# Patient Record
Sex: MALE | URBAN - METROPOLITAN AREA
[De-identification: names, ages, dates, MRNs, and addresses within clinical notes are randomized per-mention and may not be internally consistent; named-entity substitution may affect disease eponyms.]

---

## 2019-12-20 ENCOUNTER — TELEPHONE (OUTPATIENT)
Dept: UROLOGY | Age: 64
End: 2019-12-20

## 2020-06-09 ENCOUNTER — OFFICE VISIT (OUTPATIENT)
Dept: FAMILY MEDICINE CLINIC | Facility: CLINIC | Age: 65
End: 2020-06-09

## 2020-06-09 ENCOUNTER — LABORATORY ENCOUNTER (OUTPATIENT)
Dept: LAB | Age: 65
End: 2020-06-09
Attending: FAMILY MEDICINE

## 2020-06-09 VITALS
TEMPERATURE: 98 F | HEIGHT: 70 IN | WEIGHT: 213 LBS | HEART RATE: 59 BPM | SYSTOLIC BLOOD PRESSURE: 128 MMHG | OXYGEN SATURATION: 97 % | DIASTOLIC BLOOD PRESSURE: 80 MMHG | BODY MASS INDEX: 30.49 KG/M2

## 2020-06-09 DIAGNOSIS — E11.9 TYPE 2 DIABETES MELLITUS WITHOUT COMPLICATION, WITH LONG-TERM CURRENT USE OF INSULIN (HCC): ICD-10-CM

## 2020-06-09 DIAGNOSIS — I10 ESSENTIAL HYPERTENSION: ICD-10-CM

## 2020-06-09 DIAGNOSIS — Z79.01 ENCOUNTER FOR CURRENT LONG-TERM USE OF ANTICOAGULANTS: ICD-10-CM

## 2020-06-09 DIAGNOSIS — N18.30 CHRONIC KIDNEY DISEASE, STAGE III (MODERATE) (HCC): ICD-10-CM

## 2020-06-09 DIAGNOSIS — Z79.899 ENCOUNTER FOR LONG-TERM (CURRENT) USE OF MEDICATIONS: ICD-10-CM

## 2020-06-09 DIAGNOSIS — Z79.899 ENCOUNTER FOR LONG-TERM (CURRENT) USE OF MEDICATIONS: Primary | ICD-10-CM

## 2020-06-09 DIAGNOSIS — Z79.4 TYPE 2 DIABETES MELLITUS WITHOUT COMPLICATION, WITH LONG-TERM CURRENT USE OF INSULIN (HCC): ICD-10-CM

## 2020-06-09 DIAGNOSIS — K21.00 GASTROESOPHAGEAL REFLUX DISEASE WITH ESOPHAGITIS: ICD-10-CM

## 2020-06-09 DIAGNOSIS — Z12.5 SCREENING PSA (PROSTATE SPECIFIC ANTIGEN): ICD-10-CM

## 2020-06-09 DIAGNOSIS — E78.2 MIXED HYPERLIPIDEMIA: ICD-10-CM

## 2020-06-09 DIAGNOSIS — I69.30 LATE EFFECT OF CEREBROVASCULAR ACCIDENT (CVA): ICD-10-CM

## 2020-06-09 PROBLEM — Z86.73 HX OF COMPLETED STROKE: Status: ACTIVE | Noted: 2018-09-14

## 2020-06-09 PROBLEM — M24.541 CONTRACTURE, RIGHT HAND: Status: ACTIVE | Noted: 2018-09-12

## 2020-06-09 PROBLEM — H53.461 HEMIANOPIA OF RIGHT EYE: Status: ACTIVE | Noted: 2018-09-14

## 2020-06-09 PROBLEM — Z87.81 HX OF COMPRESSION FRACTURE OF SPINE: Status: ACTIVE | Noted: 2018-09-14

## 2020-06-09 PROBLEM — I25.2 HX OF MYOCARDIAL INFARCTION: Status: ACTIVE | Noted: 2018-09-14

## 2020-06-09 PROBLEM — N40.1 ENLARGED PROSTATE WITH LOWER URINARY TRACT SYMPTOMS (LUTS): Status: ACTIVE | Noted: 2018-09-14

## 2020-06-09 PROBLEM — Z87.19 H/O: GI BLEED: Status: ACTIVE | Noted: 2018-09-12

## 2020-06-09 PROCEDURE — 84153 ASSAY OF PSA TOTAL: CPT | Performed by: FAMILY MEDICINE

## 2020-06-09 PROCEDURE — 99202 OFFICE O/P NEW SF 15 MIN: CPT | Performed by: FAMILY MEDICINE

## 2020-06-09 PROCEDURE — 80053 COMPREHEN METABOLIC PANEL: CPT | Performed by: FAMILY MEDICINE

## 2020-06-09 PROCEDURE — 85025 COMPLETE CBC W/AUTO DIFF WBC: CPT | Performed by: FAMILY MEDICINE

## 2020-06-09 PROCEDURE — 80061 LIPID PANEL: CPT | Performed by: FAMILY MEDICINE

## 2020-06-09 PROCEDURE — 85610 PROTHROMBIN TIME: CPT | Performed by: FAMILY MEDICINE

## 2020-06-09 PROCEDURE — 83036 HEMOGLOBIN GLYCOSYLATED A1C: CPT | Performed by: FAMILY MEDICINE

## 2020-06-09 RX ORDER — LOVASTATIN 20 MG/1
20 TABLET ORAL DAILY
COMMUNITY
Start: 2020-05-09 | End: 2020-06-10

## 2020-06-09 RX ORDER — WARFARIN SODIUM 4 MG/1
4 TABLET ORAL DAILY
COMMUNITY
Start: 2020-04-09 | End: 2020-06-10

## 2020-06-09 RX ORDER — TAMSULOSIN HYDROCHLORIDE 0.4 MG/1
0.4 CAPSULE ORAL DAILY
COMMUNITY
Start: 2020-05-09 | End: 2020-06-10

## 2020-06-09 RX ORDER — METOPROLOL TARTRATE 50 MG/1
TABLET, FILM COATED ORAL 2 TIMES DAILY
COMMUNITY
Start: 2020-04-09 | End: 2020-06-10

## 2020-06-09 RX ORDER — PANTOPRAZOLE SODIUM 40 MG/1
TABLET, DELAYED RELEASE ORAL
COMMUNITY
Start: 2020-05-09 | End: 2020-06-10

## 2020-06-09 RX ORDER — LISINOPRIL AND HYDROCHLOROTHIAZIDE 20; 12.5 MG/1; MG/1
1 TABLET ORAL DAILY
COMMUNITY
Start: 2020-04-09 | End: 2020-06-10

## 2020-06-10 ENCOUNTER — TELEPHONE (OUTPATIENT)
Dept: FAMILY MEDICINE CLINIC | Facility: CLINIC | Age: 65
End: 2020-06-10

## 2020-06-10 RX ORDER — LISINOPRIL AND HYDROCHLOROTHIAZIDE 20; 12.5 MG/1; MG/1
1 TABLET ORAL DAILY
Qty: 90 TABLET | Refills: 0 | Status: SHIPPED | OUTPATIENT
Start: 2020-06-10 | End: 2020-08-06

## 2020-06-10 RX ORDER — WARFARIN SODIUM 4 MG/1
4 TABLET ORAL DAILY
Qty: 30 TABLET | Refills: 0 | Status: SHIPPED | OUTPATIENT
Start: 2020-06-10 | End: 2020-08-06

## 2020-06-10 RX ORDER — WARFARIN SODIUM 4 MG/1
TABLET ORAL
Qty: 90 TABLET | Refills: 0 | OUTPATIENT
Start: 2020-06-10

## 2020-06-10 RX ORDER — METOPROLOL TARTRATE 50 MG/1
50 TABLET, FILM COATED ORAL 2 TIMES DAILY
Qty: 180 TABLET | Refills: 0 | Status: SHIPPED | OUTPATIENT
Start: 2020-06-10 | End: 2020-08-06

## 2020-06-10 RX ORDER — TAMSULOSIN HYDROCHLORIDE 0.4 MG/1
0.4 CAPSULE ORAL DAILY
Qty: 90 CAPSULE | Refills: 0 | Status: SHIPPED | OUTPATIENT
Start: 2020-06-10 | End: 2020-08-06

## 2020-06-10 RX ORDER — PANTOPRAZOLE SODIUM 40 MG/1
40 TABLET, DELAYED RELEASE ORAL
Qty: 90 TABLET | Refills: 0 | Status: SHIPPED | OUTPATIENT
Start: 2020-06-10 | End: 2020-08-06

## 2020-06-10 RX ORDER — LOVASTATIN 20 MG/1
20 TABLET ORAL DAILY
Qty: 90 TABLET | Refills: 0 | Status: SHIPPED | OUTPATIENT
Start: 2020-06-10 | End: 2020-08-06

## 2020-06-10 NOTE — TELEPHONE ENCOUNTER
Lila Mckenna called and left a message that [facility] doesn't have his script and he has been out of the medication for 4 days.

## 2020-06-10 NOTE — PROGRESS NOTES
Annamaria Espinosa is a 76year old male.   Patient presents with:  Establish Care      Chief Complaint Reviewed and Verified  No Further Nursing Notes to Review    Tobacco Reviewed  Allergies Reviewed  Medications Reviewed  Problem   List Reviewed  Medical Hi no cyanosis, clubbing or edema  Has contracted hand right and also right limp  Uses cane       ASSESSMENT AND PLAN:     Encounter for long-term (current) use of medications  (primary encounter diagnosis)  Gastroesophageal reflux disease with esophagitis  E blood pressure check. Meds & Refills for this Visit:  Requested Prescriptions     Signed Prescriptions Disp Refills   • Lisinopril-hydroCHLOROthiazide 20-12.5 MG Oral Tab 90 tablet 0     Sig: Take 1 tablet by mouth daily.    • Lovastatin 20 MG Oral Tab

## 2020-06-10 NOTE — TELEPHONE ENCOUNTER
See below----he left a message on our voice mail----no name of medication was left. Do you know what he is referring to?

## 2020-06-23 ENCOUNTER — TELEPHONE (OUTPATIENT)
Dept: FAMILY MEDICINE CLINIC | Facility: CLINIC | Age: 65
End: 2020-06-23

## 2020-06-23 NOTE — TELEPHONE ENCOUNTER
Marcial Daly has tried to reach [place] about his metformin, there should have been a refill on May 9th but there has not been a refill at all in May or June. They just wanted to let us know and if we need to speak with him about his medication.   No need to lilliana

## 2020-06-24 ENCOUNTER — TELEPHONE (OUTPATIENT)
Dept: FAMILY MEDICINE CLINIC | Facility: CLINIC | Age: 65
End: 2020-06-24

## 2020-06-24 DIAGNOSIS — N18.30 CHRONIC KIDNEY DISEASE, STAGE III (MODERATE) (HCC): ICD-10-CM

## 2020-06-24 DIAGNOSIS — Z79.899 ENCOUNTER FOR LONG-TERM (CURRENT) USE OF MEDICATIONS: Primary | ICD-10-CM

## 2020-06-24 DIAGNOSIS — Z79.4 TYPE 2 DIABETES MELLITUS WITHOUT COMPLICATION, WITH LONG-TERM CURRENT USE OF INSULIN (HCC): ICD-10-CM

## 2020-06-24 DIAGNOSIS — Z12.5 SCREENING PSA (PROSTATE SPECIFIC ANTIGEN): ICD-10-CM

## 2020-06-24 DIAGNOSIS — E78.2 MIXED HYPERLIPIDEMIA: ICD-10-CM

## 2020-06-24 DIAGNOSIS — E11.9 TYPE 2 DIABETES MELLITUS WITHOUT COMPLICATION, WITH LONG-TERM CURRENT USE OF INSULIN (HCC): ICD-10-CM

## 2020-06-24 NOTE — TELEPHONE ENCOUNTER
Spoke with patient who states he has been having chest pain x 24 hours. He denied any pain radiation from chest. He denied any SOB. Patient instructed to go to the ER. Patient stated he did not have a ride. Informed patient he should call 911.  He stated \"

## 2020-06-24 NOTE — TELEPHONE ENCOUNTER
Patient called back and left a voicemail stating he is feeling better and not going to go to the ER.  Tried calling the patient back twice, however got a busy signal. Call placed to patient's son Fidelia He, ok per OPAL, and he states the patient is feeling much b

## 2020-08-06 ENCOUNTER — OFFICE VISIT (OUTPATIENT)
Dept: FAMILY MEDICINE CLINIC | Facility: CLINIC | Age: 65
End: 2020-08-06

## 2020-08-06 VITALS
RESPIRATION RATE: 12 BRPM | HEART RATE: 64 BPM | TEMPERATURE: 98 F | HEIGHT: 70 IN | SYSTOLIC BLOOD PRESSURE: 138 MMHG | DIASTOLIC BLOOD PRESSURE: 80 MMHG | BODY MASS INDEX: 28.35 KG/M2 | OXYGEN SATURATION: 97 % | WEIGHT: 198 LBS

## 2020-08-06 DIAGNOSIS — Z86.73 HX OF COMPLETED STROKE: ICD-10-CM

## 2020-08-06 DIAGNOSIS — K21.00 GASTROESOPHAGEAL REFLUX DISEASE WITH ESOPHAGITIS: ICD-10-CM

## 2020-08-06 DIAGNOSIS — N40.1 BENIGN PROSTATIC HYPERPLASIA WITH URINARY OBSTRUCTION: ICD-10-CM

## 2020-08-06 DIAGNOSIS — I69.30 LATE EFFECT OF CEREBROVASCULAR ACCIDENT (CVA): ICD-10-CM

## 2020-08-06 DIAGNOSIS — I10 ESSENTIAL HYPERTENSION: ICD-10-CM

## 2020-08-06 DIAGNOSIS — E11.69 MIXED HYPERLIPIDEMIA DUE TO TYPE 2 DIABETES MELLITUS (HCC): ICD-10-CM

## 2020-08-06 DIAGNOSIS — Z79.4 TYPE 2 DIABETES MELLITUS WITHOUT COMPLICATION, WITH LONG-TERM CURRENT USE OF INSULIN (HCC): ICD-10-CM

## 2020-08-06 DIAGNOSIS — Z79.899 ENCOUNTER FOR LONG-TERM (CURRENT) USE OF MEDICATIONS: Primary | ICD-10-CM

## 2020-08-06 DIAGNOSIS — E78.2 MIXED HYPERLIPIDEMIA DUE TO TYPE 2 DIABETES MELLITUS (HCC): ICD-10-CM

## 2020-08-06 DIAGNOSIS — K21.9 GASTROESOPHAGEAL REFLUX DISEASE WITHOUT ESOPHAGITIS: ICD-10-CM

## 2020-08-06 DIAGNOSIS — Z79.01 LONG TERM (CURRENT) USE OF ANTICOAGULANTS: ICD-10-CM

## 2020-08-06 DIAGNOSIS — E11.9 TYPE 2 DIABETES MELLITUS WITHOUT COMPLICATION, WITH LONG-TERM CURRENT USE OF INSULIN (HCC): ICD-10-CM

## 2020-08-06 DIAGNOSIS — N18.30 CHRONIC KIDNEY DISEASE, STAGE III (MODERATE) (HCC): ICD-10-CM

## 2020-08-06 DIAGNOSIS — N13.8 BENIGN PROSTATIC HYPERPLASIA WITH URINARY OBSTRUCTION: ICD-10-CM

## 2020-08-06 DIAGNOSIS — I25.2 HX OF MYOCARDIAL INFARCTION: ICD-10-CM

## 2020-08-06 LAB — INR: 1 (ref 0.8–1.2)

## 2020-08-06 PROCEDURE — 85610 PROTHROMBIN TIME: CPT | Performed by: FAMILY MEDICINE

## 2020-08-06 PROCEDURE — 3079F DIAST BP 80-89 MM HG: CPT | Performed by: FAMILY MEDICINE

## 2020-08-06 PROCEDURE — 99214 OFFICE O/P EST MOD 30 MIN: CPT | Performed by: FAMILY MEDICINE

## 2020-08-06 PROCEDURE — 3008F BODY MASS INDEX DOCD: CPT | Performed by: FAMILY MEDICINE

## 2020-08-06 PROCEDURE — 3075F SYST BP GE 130 - 139MM HG: CPT | Performed by: FAMILY MEDICINE

## 2020-08-06 RX ORDER — WARFARIN SODIUM 4 MG/1
4 TABLET ORAL DAILY
Qty: 30 TABLET | Refills: 1 | Status: SHIPPED | OUTPATIENT
Start: 2020-08-06 | End: 2020-12-03

## 2020-08-06 RX ORDER — PANTOPRAZOLE SODIUM 40 MG/1
40 TABLET, DELAYED RELEASE ORAL
Qty: 90 TABLET | Refills: 1 | Status: SHIPPED | OUTPATIENT
Start: 2020-08-06 | End: 2021-01-18

## 2020-08-06 RX ORDER — TAMSULOSIN HYDROCHLORIDE 0.4 MG/1
0.4 CAPSULE ORAL DAILY
Qty: 90 CAPSULE | Refills: 0 | Status: CANCELLED | OUTPATIENT
Start: 2020-08-06 | End: 2020-11-04

## 2020-08-06 RX ORDER — WARFARIN SODIUM 4 MG/1
4 TABLET ORAL DAILY
Qty: 30 TABLET | Refills: 0 | Status: SHIPPED | OUTPATIENT
Start: 2020-08-06 | End: 2020-08-06

## 2020-08-06 RX ORDER — WARFARIN SODIUM 4 MG/1
TABLET ORAL
Qty: 90 TABLET | Refills: 0 | OUTPATIENT
Start: 2020-08-06

## 2020-08-06 RX ORDER — TAMSULOSIN HYDROCHLORIDE 0.4 MG/1
0.4 CAPSULE ORAL DAILY
Qty: 90 CAPSULE | Refills: 1 | Status: SHIPPED | OUTPATIENT
Start: 2020-08-06 | End: 2021-01-18

## 2020-08-06 RX ORDER — LOVASTATIN 20 MG/1
20 TABLET ORAL DAILY
Qty: 90 TABLET | Refills: 1 | Status: SHIPPED | OUTPATIENT
Start: 2020-08-06 | End: 2021-01-18

## 2020-08-06 RX ORDER — METOPROLOL TARTRATE 50 MG/1
50 TABLET, FILM COATED ORAL 2 TIMES DAILY
Qty: 180 TABLET | Refills: 1 | Status: SHIPPED | OUTPATIENT
Start: 2020-08-06 | End: 2021-01-18

## 2020-08-06 RX ORDER — LISINOPRIL AND HYDROCHLOROTHIAZIDE 20; 12.5 MG/1; MG/1
1 TABLET ORAL DAILY
Qty: 90 TABLET | Refills: 0 | Status: SHIPPED | OUTPATIENT
Start: 2020-08-06 | End: 2021-02-11

## 2020-08-06 RX ORDER — LISINOPRIL AND HYDROCHLOROTHIAZIDE 20; 12.5 MG/1; MG/1
1 TABLET ORAL DAILY
Qty: 90 TABLET | Refills: 0 | Status: CANCELLED | OUTPATIENT
Start: 2020-08-06 | End: 2020-11-04

## 2020-08-06 NOTE — TELEPHONE ENCOUNTER
Last OV: 8/6/20  Last refill:  Requested Prescriptions     Pending Prescriptions Disp Refills   • WARFARIN SODIUM 4 MG Oral Tab [Pharmacy Med Name: WARFARIN SOD 4MG TABLETS (BLUE)] 90 tablet 0     Sig: TAKE 1 TABLET BY MOUTH EVERY DAY     No future appoint

## 2020-08-06 NOTE — PROGRESS NOTES
Charlotte Chavira is a 76year old male. Patient presents with:  Medication Follow-Up: inrm.  6      Chief Complaint Reviewed and Verified  Nursing Notes Reviewed and   Verified  Tobacco Reviewed  Allergies Reviewed  Medications Reviewed    Problem List Revi outpatient medications on file prior to visit. No current facility-administered medications on file prior to visit.            Social History:  Social History    Tobacco Use      Smoking status: Never Smoker      Smokeless tobacco: Never Used    Alcohol us without complication, with long-term current use of insulin (hcc)  Gastroesophageal reflux disease without esophagitis  Gastroesophageal reflux disease with esophagitis  Benign prostatic hyperplasia with urinary obstruction  Mixed hyperlipidemia due to typ PROTHROMBIN TIME (Completed)    Mixed hyperlipidemia due to type 2 diabetes mellitus (HCC)        Relevant Medications    Lovastatin 20 MG Oral Tab    metFORMIN HCl 850 MG Oral Tab    Other Relevant Orders    PROTHROMBIN TIME (Completed)          Return in

## 2020-08-27 ENCOUNTER — OFFICE VISIT (OUTPATIENT)
Dept: FAMILY MEDICINE CLINIC | Facility: CLINIC | Age: 65
End: 2020-08-27

## 2020-08-27 VITALS
RESPIRATION RATE: 12 BRPM | BODY MASS INDEX: 27.2 KG/M2 | TEMPERATURE: 98 F | DIASTOLIC BLOOD PRESSURE: 88 MMHG | HEIGHT: 70 IN | WEIGHT: 190 LBS | OXYGEN SATURATION: 97 % | SYSTOLIC BLOOD PRESSURE: 138 MMHG | HEART RATE: 72 BPM

## 2020-08-27 DIAGNOSIS — Z86.73 HX OF COMPLETED STROKE: ICD-10-CM

## 2020-08-27 DIAGNOSIS — Z02.9 ADMINISTRATIVE ENCOUNTER: ICD-10-CM

## 2020-08-27 DIAGNOSIS — I10 ESSENTIAL HYPERTENSION: Primary | ICD-10-CM

## 2020-08-27 PROCEDURE — 99213 OFFICE O/P EST LOW 20 MIN: CPT | Performed by: FAMILY MEDICINE

## 2020-08-27 PROCEDURE — 3079F DIAST BP 80-89 MM HG: CPT | Performed by: FAMILY MEDICINE

## 2020-08-27 PROCEDURE — 3075F SYST BP GE 130 - 139MM HG: CPT | Performed by: FAMILY MEDICINE

## 2020-08-27 PROCEDURE — 3008F BODY MASS INDEX DOCD: CPT | Performed by: FAMILY MEDICINE

## 2020-08-27 NOTE — PROGRESS NOTES
Gareth Connor is a 76year old male. Patient presents with:  Forms Completion: inrm.  5    Chief Complaint Reviewed and Verified  Nursing Notes Reviewed and   Verified  Tobacco Reviewed  Allergies Reviewed  Medications Reviewed    Problem List Reviewed Take 1 tablet (40 mg total) by mouth every morning before breakfast. 90 tablet 1   • tamsulosin (FLOMAX) cap Take 1 capsule (0.4 mg total) by mouth daily.  90 capsule 1   • metFORMIN HCl 850 MG Oral Tab Take 1 tablet (850 mg total) by mouth 2 (two) times da were placed in this encounter. No orders of the defined types were placed in this encounter. Return if symptoms worsen or fail to improve, for When he has appropriate 's office. .      Meds & Refills for this Visit:  Requested Pres

## 2020-11-19 ENCOUNTER — OFFICE VISIT (OUTPATIENT)
Dept: FAMILY MEDICINE CLINIC | Facility: CLINIC | Age: 65
End: 2020-11-19
Payer: MEDICARE

## 2020-11-19 VITALS
DIASTOLIC BLOOD PRESSURE: 80 MMHG | BODY MASS INDEX: 27.77 KG/M2 | HEIGHT: 70 IN | OXYGEN SATURATION: 96 % | TEMPERATURE: 98 F | WEIGHT: 194 LBS | HEART RATE: 71 BPM | SYSTOLIC BLOOD PRESSURE: 138 MMHG

## 2020-11-19 DIAGNOSIS — N40.1 BENIGN PROSTATIC HYPERPLASIA WITH URINARY OBSTRUCTION: ICD-10-CM

## 2020-11-19 DIAGNOSIS — I10 ESSENTIAL HYPERTENSION: ICD-10-CM

## 2020-11-19 DIAGNOSIS — Z79.01 LONG TERM (CURRENT) USE OF ANTICOAGULANTS: ICD-10-CM

## 2020-11-19 DIAGNOSIS — N13.8 BENIGN PROSTATIC HYPERPLASIA WITH URINARY OBSTRUCTION: ICD-10-CM

## 2020-11-19 DIAGNOSIS — I71.4 ABDOMINAL AORTIC ANEURYSM (AAA) WITHOUT RUPTURE (HCC): ICD-10-CM

## 2020-11-19 DIAGNOSIS — N20.0 RENAL CALCULUS, BILATERAL: ICD-10-CM

## 2020-11-19 DIAGNOSIS — Z86.73 HX OF COMPLETED STROKE: ICD-10-CM

## 2020-11-19 DIAGNOSIS — N39.42 URINARY INCONTINENCE WITHOUT SENSORY AWARENESS: ICD-10-CM

## 2020-11-19 DIAGNOSIS — I69.30 LATE EFFECT OF CEREBROVASCULAR ACCIDENT (CVA): ICD-10-CM

## 2020-11-19 DIAGNOSIS — R19.5 LOOSE STOOLS: ICD-10-CM

## 2020-11-19 DIAGNOSIS — I25.2 HX OF MYOCARDIAL INFARCTION: ICD-10-CM

## 2020-11-19 DIAGNOSIS — Z79.899 ENCOUNTER FOR LONG-TERM (CURRENT) USE OF MEDICATIONS: Primary | ICD-10-CM

## 2020-11-19 DIAGNOSIS — N18.32 STAGE 3B CHRONIC KIDNEY DISEASE (HCC): ICD-10-CM

## 2020-11-19 PROCEDURE — 3075F SYST BP GE 130 - 139MM HG: CPT | Performed by: FAMILY MEDICINE

## 2020-11-19 PROCEDURE — 3008F BODY MASS INDEX DOCD: CPT | Performed by: FAMILY MEDICINE

## 2020-11-19 PROCEDURE — 3079F DIAST BP 80-89 MM HG: CPT | Performed by: FAMILY MEDICINE

## 2020-11-19 PROCEDURE — 99214 OFFICE O/P EST MOD 30 MIN: CPT | Performed by: FAMILY MEDICINE

## 2020-11-24 NOTE — PROGRESS NOTES
Giovani Claire is a 76year old male. Patient presents with:  ER F/U: dx with kidney stones .  inrm 6  Hypertension      Chief Complaint Reviewed and Verified  Nursing Notes Reviewed and   Verified  Tobacco Reviewed  Allergies Reviewed  Medications Review mouth daily. , Disp: 90 capsule, Rfl: 1    •  metFORMIN HCl 850 MG Oral Tab, Take 1 tablet (850 mg total) by mouth 2 (two) times daily with meals. , Disp: 180 tablet, Rfl: 1    No current facility-administered medications on file prior to visit.            So medications  (primary encounter diagnosis)  Essential hypertension  Abdominal aortic aneurysm (aaa) without rupture (hcc)  Benign prostatic hyperplasia with urinary obstruction  Stage 3b chronic kidney disease  Long term (current) use of anticoagulants  Re

## 2020-11-30 ENCOUNTER — TELEPHONE (OUTPATIENT)
Dept: FAMILY MEDICINE CLINIC | Facility: CLINIC | Age: 65
End: 2020-11-30

## 2020-11-30 DIAGNOSIS — Z86.73 HX OF COMPLETED STROKE: ICD-10-CM

## 2020-11-30 DIAGNOSIS — N39.42 URINARY INCONTINENCE WITHOUT SENSORY AWARENESS: Primary | ICD-10-CM

## 2020-11-30 DIAGNOSIS — R19.5 LOOSE STOOLS: ICD-10-CM

## 2020-11-30 RX ORDER — TAMSULOSIN HYDROCHLORIDE 0.4 MG/1
0.4 CAPSULE ORAL DAILY
COMMUNITY
Start: 2019-09-11 | End: 2020-11-30

## 2020-11-30 NOTE — TELEPHONE ENCOUNTER
Casey, son, is calling he would like to speak with someone about getting help for his father, such as helping him bath, get dressed ect. Robb said that it's becoming a lot for him to handle please call.
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
## 2020-12-01 ENCOUNTER — TELEPHONE (OUTPATIENT)
Dept: FAMILY MEDICINE CLINIC | Facility: CLINIC | Age: 65
End: 2020-12-01

## 2020-12-01 NOTE — TELEPHONE ENCOUNTER
Patient son is very upset and loudly verbalizing that patient is not able to take care of himself. He also stated that he is 32years old and should not be caring for a 75 y/o man that is \"incontinent\".  He stated his father needs to be seen by a doctor a

## 2020-12-02 NOTE — TELEPHONE ENCOUNTER
Late entry  Discussed yesterday, December 1      Patient had not appeared to be unable to care for himself on previous visits. The  fecal incontinence  is new but urinary incontinence was already established.     Patient is free to investigate enrollment

## 2020-12-03 ENCOUNTER — TELEPHONE (OUTPATIENT)
Dept: FAMILY MEDICINE CLINIC | Facility: CLINIC | Age: 65
End: 2020-12-03

## 2020-12-03 DIAGNOSIS — Z79.01 LONG TERM (CURRENT) USE OF ANTICOAGULANTS: ICD-10-CM

## 2020-12-03 DIAGNOSIS — I69.30 LATE EFFECT OF CEREBROVASCULAR ACCIDENT (CVA): ICD-10-CM

## 2020-12-03 RX ORDER — WARFARIN SODIUM 4 MG/1
TABLET ORAL
Qty: 90 TABLET | Refills: 0 | Status: SHIPPED | OUTPATIENT
Start: 2020-12-03 | End: 2021-08-02

## 2020-12-03 NOTE — TELEPHONE ENCOUNTER
Triston Patel called and advised that the patient lost his SS Card and he is helping him to get another one.  He needs a copy of the last visit note with Dr Jono Campbell on it (not electronic signature.)    Please call triston Bateman when it is ready to be p

## 2020-12-03 NOTE — TELEPHONE ENCOUNTER
Note printed and awaiting Dr. Padron Manual. Robb aware Dr. Christiano Pozo is out of office this afternoon, but will be back in office tomorrow morning. He verbalized understanding.

## 2020-12-03 NOTE — TELEPHONE ENCOUNTER
Last refill: 08/06/20  Qty: 30  W/ 1 refills  Last ov: 11/19/20    Requested Prescriptions     Pending Prescriptions Disp Refills   • WARFARIN SODIUM 4 MG Oral Tab [Pharmacy Med Name: WARFARIN SOD 4MG TABLETS (BLUE)] 90 tablet 0     Sig: TAKE 1 TABLET(4 MG

## 2020-12-04 NOTE — TELEPHONE ENCOUNTER
Spoke to EMERITA regarding patients physician office note that needs to be signed by the physician. Explained that once he receives the letter bring it into the office. Dr. Vero Cesar will look over letter and advise.

## 2020-12-08 NOTE — TELEPHONE ENCOUNTER
Spoke to EMERITA and at this time is not a concern. He is requesting now to get help with obtaining a social security card. He will be getting a form to fill out.

## 2021-01-01 ENCOUNTER — TELEPHONE (OUTPATIENT)
Dept: UROLOGY | Age: 66
End: 2021-01-01

## 2021-01-01 ENCOUNTER — TELEPHONE (OUTPATIENT)
Dept: INTERNAL MEDICINE | Age: 66
End: 2021-01-01

## 2021-01-01 ENCOUNTER — APPOINTMENT (OUTPATIENT)
Dept: INTERNAL MEDICINE | Age: 66
End: 2021-01-01

## 2021-01-15 ENCOUNTER — TELEPHONE (OUTPATIENT)
Dept: FAMILY MEDICINE CLINIC | Facility: CLINIC | Age: 66
End: 2021-01-15

## 2021-01-15 DIAGNOSIS — I10 ESSENTIAL HYPERTENSION: ICD-10-CM

## 2021-01-15 DIAGNOSIS — E78.2 MIXED HYPERLIPIDEMIA DUE TO TYPE 2 DIABETES MELLITUS (HCC): ICD-10-CM

## 2021-01-15 DIAGNOSIS — E11.69 MIXED HYPERLIPIDEMIA DUE TO TYPE 2 DIABETES MELLITUS (HCC): ICD-10-CM

## 2021-01-15 DIAGNOSIS — N13.8 BENIGN PROSTATIC HYPERPLASIA WITH URINARY OBSTRUCTION: ICD-10-CM

## 2021-01-15 DIAGNOSIS — N39.42 URINARY INCONTINENCE WITHOUT SENSORY AWARENESS: Primary | ICD-10-CM

## 2021-01-15 DIAGNOSIS — I69.30 LATE EFFECT OF CEREBROVASCULAR ACCIDENT (CVA): ICD-10-CM

## 2021-01-15 DIAGNOSIS — N40.1 BENIGN PROSTATIC HYPERPLASIA WITH URINARY OBSTRUCTION: ICD-10-CM

## 2021-01-15 DIAGNOSIS — Z79.4 TYPE 2 DIABETES MELLITUS WITHOUT COMPLICATION, WITH LONG-TERM CURRENT USE OF INSULIN (HCC): ICD-10-CM

## 2021-01-15 DIAGNOSIS — K21.00 GASTROESOPHAGEAL REFLUX DISEASE WITH ESOPHAGITIS: ICD-10-CM

## 2021-01-15 DIAGNOSIS — E11.9 TYPE 2 DIABETES MELLITUS WITHOUT COMPLICATION, WITH LONG-TERM CURRENT USE OF INSULIN (HCC): ICD-10-CM

## 2021-01-15 NOTE — TELEPHONE ENCOUNTER
NEED REFERRAL TO DR PARDEEP Brand? (SON DID NOT HAVE SPELLING OF DR'S NAME)-UROLOGIST @ [address], ALSO NEED REFILLS ON ALL HIS MEDS TO [facility], CALL SON BACK

## 2021-01-15 NOTE — TELEPHONE ENCOUNTER
LOV 11/19/2020    LAST LAB 11/17/2020    LAST RX  Lovastatin #90 R1 08/06/2020  Metoprolol #180 R1 08/06/2020  Pantoprazole #90 R1 08/06/2020  tamsulosin #90 R1 08/06/2020  Metformin #180 R1 08/06/2020    Next OV No future appointments.     PROTOCOl

## 2021-01-18 RX ORDER — LOVASTATIN 20 MG/1
20 TABLET ORAL DAILY
Qty: 90 TABLET | Refills: 1 | Status: SHIPPED | OUTPATIENT
Start: 2021-01-18 | End: 2021-08-03

## 2021-01-18 RX ORDER — METOPROLOL TARTRATE 50 MG/1
50 TABLET, FILM COATED ORAL 2 TIMES DAILY
Qty: 180 TABLET | Refills: 1 | Status: SHIPPED | OUTPATIENT
Start: 2021-01-18 | End: 2021-08-03

## 2021-01-18 RX ORDER — PANTOPRAZOLE SODIUM 40 MG/1
40 TABLET, DELAYED RELEASE ORAL
Qty: 90 TABLET | Refills: 1 | Status: SHIPPED | OUTPATIENT
Start: 2021-01-18 | End: 2021-08-03

## 2021-01-18 RX ORDER — TAMSULOSIN HYDROCHLORIDE 0.4 MG/1
0.4 CAPSULE ORAL DAILY
Qty: 90 CAPSULE | Refills: 1 | Status: SHIPPED | OUTPATIENT
Start: 2021-01-18 | End: 2021-08-03

## 2021-01-19 ENCOUNTER — TELEPHONE (OUTPATIENT)
Dept: INTERNAL MEDICINE | Age: 66
End: 2021-01-19

## 2021-01-29 DIAGNOSIS — Z23 NEED FOR VACCINATION: ICD-10-CM

## 2021-02-10 DIAGNOSIS — N18.30 CHRONIC KIDNEY DISEASE, STAGE III (MODERATE) (HCC): ICD-10-CM

## 2021-02-10 DIAGNOSIS — I10 ESSENTIAL HYPERTENSION: ICD-10-CM

## 2021-02-11 RX ORDER — LISINOPRIL AND HYDROCHLOROTHIAZIDE 20; 12.5 MG/1; MG/1
1 TABLET ORAL DAILY
Qty: 90 TABLET | Refills: 0 | Status: SHIPPED | OUTPATIENT
Start: 2021-02-11 | End: 2021-08-03

## 2021-02-11 NOTE — TELEPHONE ENCOUNTER
Last refill #90 on 8/6/2020  Last office visit pertaining to refill on 11/19/2020  No future appointments.   BP Readings from Last 3 Encounters:  11/19/20 : 138/80  08/27/20 : 138/88  08/06/20 : 138/80    Last cmp - patient reported on 11/17/2020

## 2021-02-19 ENCOUNTER — OFFICE VISIT (OUTPATIENT)
Dept: INTERNAL MEDICINE | Age: 66
End: 2021-02-19

## 2021-02-19 ENCOUNTER — LAB SERVICES (OUTPATIENT)
Dept: LAB | Age: 66
End: 2021-02-19

## 2021-02-19 VITALS
SYSTOLIC BLOOD PRESSURE: 146 MMHG | BODY MASS INDEX: 28.06 KG/M2 | HEIGHT: 70 IN | RESPIRATION RATE: 16 BRPM | HEART RATE: 64 BPM | TEMPERATURE: 97.8 F | WEIGHT: 196 LBS | DIASTOLIC BLOOD PRESSURE: 90 MMHG

## 2021-02-19 DIAGNOSIS — R27.0 ATAXIA: ICD-10-CM

## 2021-02-19 DIAGNOSIS — Z95.2 HISTORY OF HEART VALVE REPLACEMENT WITH MECHANICAL VALVE: ICD-10-CM

## 2021-02-19 DIAGNOSIS — E11.9 TYPE 2 DIABETES MELLITUS WITHOUT COMPLICATION, WITHOUT LONG-TERM CURRENT USE OF INSULIN (CMD): ICD-10-CM

## 2021-02-19 DIAGNOSIS — R32 URINARY INCONTINENCE, UNSPECIFIED TYPE: ICD-10-CM

## 2021-02-19 DIAGNOSIS — R32 URINARY INCONTINENCE, UNSPECIFIED TYPE: Primary | ICD-10-CM

## 2021-02-19 LAB
ALBUMIN SERPL-MCNC: 4.4 G/DL (ref 3.6–5.1)
ALP SERPL-CCNC: 60 U/L (ref 45–115)
ALT SERPL W/O P-5'-P-CCNC: 8 U/L (ref 5–49)
AST SERPL-CCNC: 22 U/L (ref 14–43)
BASOPHIL %: 0.7 % (ref 0–1.2)
BASOPHIL ABSOLUTE #: 0.1 10*3/UL (ref 0–0.1)
BILIRUB SERPL-MCNC: 0.6 MG/DL (ref 0–1.3)
BUN SERPL-MCNC: 20 MG/DL (ref 6–27)
CALCIUM SERPL-MCNC: 9.8 MG/DL (ref 8.6–10.6)
CHLORIDE SERPL-SCNC: 102 MMOL/L (ref 96–107)
CO2 SERPL-SCNC: 28 MMOL/L (ref 22–32)
CREAT SERPL-MCNC: 1.7 MG/DL (ref 0.6–1.6)
DIFFERENTIAL TYPE: ABNORMAL
EOSINOPHIL %: 1.5 % (ref 0–10)
EOSINOPHIL ABSOLUTE #: 0.1 10*3/UL (ref 0–0.5)
EST. AVERAGE GLUCOSE BLD GHB EST-MCNC: 110 MG/DL (ref 0–154)
GFR SERPL CREATININE-BSD FRML MDRD: 40 ML/MIN/{1.73M2}
GFR SERPL CREATININE-BSD FRML MDRD: 49 ML/MIN/{1.73M2}
GLUCOSE SERPL-MCNC: 71 MG/DL (ref 70–200)
HBA1C MFR BLD: 5.5 % (ref 4.2–6)
HEMATOCRIT: 41.3 % (ref 40–51)
HEMOGLOBIN: 12.8 G/DL (ref 13.7–17.5)
IMMATURE GRANULOCYTE ABSOLUTE: 0.02 10*3/UL (ref 0–0.05)
IMMATURE GRANULOCYTE PERCENT: 0.2 % (ref 0–0.5)
LYMPH PERCENT: 19.6 % (ref 20.5–51.1)
LYMPHOCYTE ABSOLUTE #: 1.8 10*3/UL (ref 1.2–3.4)
MEAN CORPUSCULAR HGB CONCENTRATION: 31 % (ref 32–36)
MEAN CORPUSCULAR HGB: 26.8 PG (ref 27–34)
MEAN CORPUSCULAR VOLUME: 86.6 FL (ref 79–95)
MEAN PLATELET VOLUME: 11.1 FL (ref 8.6–12.4)
MONOCYTE ABSOLUTE #: 0.8 10*3/UL (ref 0.2–0.9)
MONOCYTE PERCENT: 8.2 % (ref 4.3–12.9)
NEUTROPHIL ABSOLUTE #: 6.4 10*3/UL (ref 1.4–6.5)
NEUTROPHIL PERCENT: 69.8 % (ref 34–73.5)
PLATELET COUNT: 225 10*3/UL (ref 150–400)
POTASSIUM SERPL-SCNC: 5.3 MMOL/L (ref 3.5–5.3)
PROT SERPL-MCNC: 7.7 G/DL (ref 6.4–8.5)
PSA SERPL-MCNC: 6.18 NG/ML (ref 0–4.9)
RED BLOOD CELL COUNT: 4.77 10*6/UL (ref 3.9–5.7)
RED CELL DISTRIBUTION WIDTH: 13.2 % (ref 11.3–14.8)
SODIUM SERPL-SCNC: 139 MMOL/L (ref 136–146)
WHITE BLOOD CELL COUNT: 9.1 10*3/UL (ref 4–10)

## 2021-02-19 PROCEDURE — 99204 OFFICE O/P NEW MOD 45 MIN: CPT | Performed by: INTERNAL MEDICINE

## 2021-02-19 PROCEDURE — 80053 COMPREHEN METABOLIC PANEL: CPT | Performed by: INTERNAL MEDICINE

## 2021-02-19 PROCEDURE — 3044F HG A1C LEVEL LT 7.0%: CPT | Performed by: FAMILY MEDICINE

## 2021-02-19 PROCEDURE — 84153 ASSAY OF PSA TOTAL: CPT | Performed by: INTERNAL MEDICINE

## 2021-02-19 PROCEDURE — 85025 COMPLETE CBC W/AUTO DIFF WBC: CPT | Performed by: INTERNAL MEDICINE

## 2021-02-19 PROCEDURE — 83036 HEMOGLOBIN GLYCOSYLATED A1C: CPT | Performed by: INTERNAL MEDICINE

## 2021-02-19 PROCEDURE — 36415 COLL VENOUS BLD VENIPUNCTURE: CPT | Performed by: INTERNAL MEDICINE

## 2021-02-19 RX ORDER — LOVASTATIN 20 MG/1
20 TABLET ORAL DAILY
COMMUNITY
Start: 2019-09-10 | End: 2021-03-12 | Stop reason: SDUPTHER

## 2021-02-19 RX ORDER — LISINOPRIL AND HYDROCHLOROTHIAZIDE 20; 12.5 MG/1; MG/1
1 TABLET ORAL DAILY
COMMUNITY
Start: 2019-09-10 | End: 2021-03-12 | Stop reason: SDUPTHER

## 2021-02-19 RX ORDER — METOPROLOL TARTRATE 50 MG/1
50 TABLET, FILM COATED ORAL 2 TIMES DAILY
COMMUNITY
Start: 2019-09-10 | End: 2021-03-12 | Stop reason: SDUPTHER

## 2021-02-19 RX ORDER — TAMSULOSIN HYDROCHLORIDE 0.4 MG/1
0.4 CAPSULE ORAL DAILY
COMMUNITY
Start: 2019-09-11 | End: 2021-03-12 | Stop reason: SDUPTHER

## 2021-02-19 RX ORDER — WARFARIN SODIUM 4 MG/1
TABLET ORAL DAILY
COMMUNITY
Start: 2019-09-10 | End: 2021-03-12 | Stop reason: SDUPTHER

## 2021-02-19 ASSESSMENT — ENCOUNTER SYMPTOMS
SHORTNESS OF BREATH: 0
FEVER: 0
CHILLS: 0

## 2021-02-19 ASSESSMENT — PATIENT HEALTH QUESTIONNAIRE - PHQ9
SUM OF ALL RESPONSES TO PHQ9 QUESTIONS 1 AND 2: 0
2. FEELING DOWN, DEPRESSED OR HOPELESS: NOT AT ALL
1. LITTLE INTEREST OR PLEASURE IN DOING THINGS: NOT AT ALL
CLINICAL INTERPRETATION OF PHQ9 SCORE: NO FURTHER SCREENING NEEDED
CLINICAL INTERPRETATION OF PHQ2 SCORE: NO FURTHER SCREENING NEEDED
SUM OF ALL RESPONSES TO PHQ9 QUESTIONS 1 AND 2: 0

## 2021-02-23 ENCOUNTER — APPOINTMENT (OUTPATIENT)
Dept: MRI IMAGING | Age: 66
End: 2021-02-23
Attending: INTERNAL MEDICINE

## 2021-02-28 ENCOUNTER — IMAGING SERVICES (OUTPATIENT)
Dept: MRI IMAGING | Age: 66
End: 2021-02-28
Attending: INTERNAL MEDICINE

## 2021-02-28 DIAGNOSIS — R27.0 ATAXIA: ICD-10-CM

## 2021-02-28 DIAGNOSIS — R32 URINARY INCONTINENCE, UNSPECIFIED TYPE: ICD-10-CM

## 2021-02-28 PROCEDURE — 70551 MRI BRAIN STEM W/O DYE: CPT | Performed by: RADIOLOGY

## 2021-03-02 ENCOUNTER — TELEPHONE (OUTPATIENT)
Dept: INTERNAL MEDICINE | Age: 66
End: 2021-03-02

## 2021-03-02 DIAGNOSIS — R97.20 ABNORMAL PSA: Primary | ICD-10-CM

## 2021-03-02 DIAGNOSIS — R27.0 ATAXIA: Primary | ICD-10-CM

## 2021-03-04 ENCOUNTER — OFFICE VISIT (OUTPATIENT)
Dept: UROLOGY | Age: 66
End: 2021-03-04

## 2021-03-04 VITALS — BODY MASS INDEX: 28.63 KG/M2 | HEIGHT: 70 IN | WEIGHT: 200 LBS | TEMPERATURE: 97.5 F

## 2021-03-04 DIAGNOSIS — R97.20 ELEVATED PSA: Primary | ICD-10-CM

## 2021-03-04 DIAGNOSIS — R35.0 BENIGN PROSTATIC HYPERPLASIA WITH URINARY FREQUENCY: ICD-10-CM

## 2021-03-04 DIAGNOSIS — N40.1 BENIGN PROSTATIC HYPERPLASIA WITH URINARY FREQUENCY: ICD-10-CM

## 2021-03-04 PROCEDURE — 99204 OFFICE O/P NEW MOD 45 MIN: CPT | Performed by: UROLOGY

## 2021-03-04 RX ORDER — AMOXICILLIN 500 MG/1
500 TABLET, FILM COATED ORAL 2 TIMES DAILY
Qty: 42 TABLET | Refills: 0 | Status: SHIPPED | OUTPATIENT
Start: 2021-03-04

## 2021-03-12 ENCOUNTER — TELEPHONE (OUTPATIENT)
Dept: INTERNAL MEDICINE | Age: 66
End: 2021-03-12

## 2021-03-12 DIAGNOSIS — Z79.01 LONG TERM CURRENT USE OF ANTICOAGULANT: Primary | ICD-10-CM

## 2021-03-12 DIAGNOSIS — I63.9 CEREBROVASCULAR ACCIDENT (CVA), UNSPECIFIED MECHANISM (CMD): ICD-10-CM

## 2021-03-12 DIAGNOSIS — Z95.2 HISTORY OF HEART VALVE REPLACEMENT WITH MECHANICAL VALVE: ICD-10-CM

## 2021-03-12 DIAGNOSIS — Z86.73 STATUS POST CVA: ICD-10-CM

## 2021-03-15 PROBLEM — Z95.2 HISTORY OF HEART VALVE REPLACEMENT WITH MECHANICAL VALVE: Status: ACTIVE | Noted: 2021-03-15

## 2021-03-15 PROBLEM — I63.9 CEREBROVASCULAR ACCIDENT (CVA), UNSPECIFIED MECHANISM (CMD): Status: ACTIVE | Noted: 2021-03-15

## 2021-03-15 PROBLEM — Z79.01 LONG TERM CURRENT USE OF ANTICOAGULANT: Status: ACTIVE | Noted: 2021-03-15

## 2021-03-15 RX ORDER — WARFARIN SODIUM 4 MG/1
4 TABLET ORAL DAILY
Qty: 90 TABLET | Refills: 0 | Status: SHIPPED | OUTPATIENT
Start: 2021-03-15

## 2021-03-15 RX ORDER — TAMSULOSIN HYDROCHLORIDE 0.4 MG/1
0.4 CAPSULE ORAL DAILY
Qty: 90 CAPSULE | Refills: 0 | Status: SHIPPED | OUTPATIENT
Start: 2021-03-15 | End: 2021-01-01

## 2021-03-15 RX ORDER — METOPROLOL TARTRATE 50 MG/1
50 TABLET, FILM COATED ORAL 2 TIMES DAILY
Qty: 180 TABLET | Refills: 0 | Status: SHIPPED | OUTPATIENT
Start: 2021-03-15 | End: 2021-01-01

## 2021-03-15 RX ORDER — LOVASTATIN 20 MG/1
20 TABLET ORAL DAILY
Qty: 90 TABLET | Refills: 0 | Status: SHIPPED | OUTPATIENT
Start: 2021-03-15 | End: 2021-01-01

## 2021-03-15 RX ORDER — LISINOPRIL AND HYDROCHLOROTHIAZIDE 20; 12.5 MG/1; MG/1
1 TABLET ORAL DAILY
Qty: 90 TABLET | Refills: 0 | Status: SHIPPED | OUTPATIENT
Start: 2021-03-15 | End: 2021-01-01

## 2021-05-26 ENCOUNTER — TELEPHONE (OUTPATIENT)
Dept: FAMILY MEDICINE CLINIC | Facility: CLINIC | Age: 66
End: 2021-05-26

## 2021-05-26 NOTE — TELEPHONE ENCOUNTER
Spoke with patient after speaking with Dr. Luciana Elam. Patient was suggested to go to the ER for chest pain. After telling patient the ER he stated pain was in left flank/chest. Patient has been seen several times in the ER for same issue.  Patient has been se

## 2021-05-26 NOTE — TELEPHONE ENCOUNTER
Lupillo WALKED INTO THE OFFICE STATING HE HAS CHEST PAIN, WENT BACK TO TALK TO EVELIA (RN) SHE CAME OUT AND SPOKE WITH KEVON.

## 2021-06-11 ENCOUNTER — OFFICE VISIT (OUTPATIENT)
Dept: FAMILY MEDICINE CLINIC | Facility: CLINIC | Age: 66
End: 2021-06-11
Payer: MEDICARE

## 2021-06-11 VITALS
TEMPERATURE: 98 F | BODY MASS INDEX: 25.84 KG/M2 | HEART RATE: 78 BPM | WEIGHT: 180.5 LBS | OXYGEN SATURATION: 99 % | RESPIRATION RATE: 12 BRPM | DIASTOLIC BLOOD PRESSURE: 78 MMHG | HEIGHT: 70 IN | SYSTOLIC BLOOD PRESSURE: 136 MMHG

## 2021-06-11 DIAGNOSIS — M24.541 CONTRACTURE, RIGHT HAND: ICD-10-CM

## 2021-06-11 DIAGNOSIS — H53.461 HEMIANOPIA OF RIGHT EYE: ICD-10-CM

## 2021-06-11 DIAGNOSIS — I69.30 LATE EFFECT OF CEREBROVASCULAR ACCIDENT (CVA): ICD-10-CM

## 2021-06-11 DIAGNOSIS — Z86.73 HX OF COMPLETED STROKE: ICD-10-CM

## 2021-06-11 DIAGNOSIS — G81.91 HEMIPARESIS, RIGHT (HCC): ICD-10-CM

## 2021-06-11 DIAGNOSIS — T74.31XD: ICD-10-CM

## 2021-06-11 DIAGNOSIS — R29.6 FREQUENT FALLS: ICD-10-CM

## 2021-06-11 DIAGNOSIS — T74.11XD: Primary | ICD-10-CM

## 2021-06-11 PROCEDURE — 3008F BODY MASS INDEX DOCD: CPT | Performed by: FAMILY MEDICINE

## 2021-06-11 PROCEDURE — 3078F DIAST BP <80 MM HG: CPT | Performed by: FAMILY MEDICINE

## 2021-06-11 PROCEDURE — 3075F SYST BP GE 130 - 139MM HG: CPT | Performed by: FAMILY MEDICINE

## 2021-06-11 PROCEDURE — 99214 OFFICE O/P EST MOD 30 MIN: CPT | Performed by: FAMILY MEDICINE
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
## 2021-06-14 ENCOUNTER — TELEPHONE (OUTPATIENT)
Dept: FAMILY MEDICINE CLINIC | Facility: CLINIC | Age: 66
End: 2021-06-14

## 2021-06-14 NOTE — TELEPHONE ENCOUNTER
Spoke to [employer]. He was asking for patients phone number. Advised that the number is usually answered by son. He stated if he cannot get through he will just show up to the house.

## 2021-07-12 ENCOUNTER — PATIENT OUTREACH (OUTPATIENT)
Dept: CASE MANAGEMENT | Age: 66
End: 2021-07-12

## 2021-07-12 NOTE — PROGRESS NOTES
Son Perry Chu called [facility]    Son Perry Chu called and informed [facility] that the contact number called is not appropriate going forward.  Son states he is not to have contact with father and provided [facility] with cell phone number [phone] and requested future outreach be

## 2021-07-12 NOTE — PROGRESS NOTES
Called pt to intro CCM services and assist patient in finding an assisted living facility. Pt voice mailbox is full and unable to leave message. Will attempt to make contact with pt regularly until contact has been established.

## 2021-07-14 ENCOUNTER — PATIENT OUTREACH (OUTPATIENT)
Dept: CASE MANAGEMENT | Age: 66
End: 2021-07-14

## 2021-07-14 DIAGNOSIS — Z86.73 HX OF COMPLETED STROKE: ICD-10-CM

## 2021-07-14 DIAGNOSIS — Z79.4 TYPE 2 DIABETES MELLITUS WITHOUT COMPLICATION, WITH LONG-TERM CURRENT USE OF INSULIN (HCC): ICD-10-CM

## 2021-07-14 DIAGNOSIS — N40.1 BENIGN PROSTATIC HYPERPLASIA WITH URINARY OBSTRUCTION: ICD-10-CM

## 2021-07-14 DIAGNOSIS — N18.32 STAGE 3B CHRONIC KIDNEY DISEASE (HCC): ICD-10-CM

## 2021-07-14 DIAGNOSIS — N13.8 BENIGN PROSTATIC HYPERPLASIA WITH URINARY OBSTRUCTION: ICD-10-CM

## 2021-07-14 DIAGNOSIS — I10 ESSENTIAL HYPERTENSION: ICD-10-CM

## 2021-07-14 DIAGNOSIS — E11.9 TYPE 2 DIABETES MELLITUS WITHOUT COMPLICATION, WITH LONG-TERM CURRENT USE OF INSULIN (HCC): ICD-10-CM

## 2021-07-14 DIAGNOSIS — E78.2 MIXED HYPERLIPIDEMIA: ICD-10-CM

## 2021-07-14 NOTE — PROGRESS NOTES
I want to know a little about you. • What do you do for fun? • Any hobbies? What Hobbies? • What do you do for work? Pt collects social security. Pt does not have any hobbies. Pt has had strokes and cannot move around much.  Walks with a cane    Social change? Would like to learn how to cook if he had some of the proper tools  o (go to goal section)  • Do you eat a variety of fruits and veggies?  no  • Any special diet you are put on?  (cardiac, Dm diet, etc) no  o Do you have any barriers to keeping with always work for another. We will continue to work on what will help you meet your needs.     Care manager f/up:  Work on for next time:   Resources needed:   Care gaps? :      Spoke to [employer] at [facility] about CCM, current care plan and performed CCM assessme

## 2021-07-31 PROCEDURE — 99490 CHRNC CARE MGMT STAFF 1ST 20: CPT

## 2021-08-02 ENCOUNTER — PATIENT OUTREACH (OUTPATIENT)
Dept: CASE MANAGEMENT | Age: 66
End: 2021-08-02

## 2021-08-02 DIAGNOSIS — M24.541 CONTRACTURE, RIGHT HAND: ICD-10-CM

## 2021-08-02 DIAGNOSIS — Z79.01 LONG TERM (CURRENT) USE OF ANTICOAGULANTS: ICD-10-CM

## 2021-08-02 DIAGNOSIS — N40.1 BENIGN PROSTATIC HYPERPLASIA WITH URINARY OBSTRUCTION: ICD-10-CM

## 2021-08-02 DIAGNOSIS — N18.30 CHRONIC KIDNEY DISEASE, STAGE III (MODERATE) (HCC): ICD-10-CM

## 2021-08-02 DIAGNOSIS — I69.30 LATE EFFECT OF CEREBROVASCULAR ACCIDENT (CVA): ICD-10-CM

## 2021-08-02 DIAGNOSIS — E11.9 TYPE 2 DIABETES MELLITUS WITHOUT COMPLICATION, WITH LONG-TERM CURRENT USE OF INSULIN (HCC): ICD-10-CM

## 2021-08-02 DIAGNOSIS — Z86.73 HX OF COMPLETED STROKE: ICD-10-CM

## 2021-08-02 DIAGNOSIS — E11.69 MIXED HYPERLIPIDEMIA DUE TO TYPE 2 DIABETES MELLITUS (HCC): ICD-10-CM

## 2021-08-02 DIAGNOSIS — I10 ESSENTIAL HYPERTENSION: ICD-10-CM

## 2021-08-02 DIAGNOSIS — Z79.4 TYPE 2 DIABETES MELLITUS WITHOUT COMPLICATION, WITH LONG-TERM CURRENT USE OF INSULIN (HCC): ICD-10-CM

## 2021-08-02 DIAGNOSIS — K21.00 GASTROESOPHAGEAL REFLUX DISEASE WITH ESOPHAGITIS: ICD-10-CM

## 2021-08-02 DIAGNOSIS — E78.2 MIXED HYPERLIPIDEMIA DUE TO TYPE 2 DIABETES MELLITUS (HCC): ICD-10-CM

## 2021-08-02 DIAGNOSIS — N13.8 BENIGN PROSTATIC HYPERPLASIA WITH URINARY OBSTRUCTION: ICD-10-CM

## 2021-08-02 DIAGNOSIS — N18.32 STAGE 3B CHRONIC KIDNEY DISEASE (HCC): ICD-10-CM

## 2021-08-02 RX ORDER — LOVASTATIN 20 MG/1
TABLET ORAL
COMMUNITY
Start: 2021-07-28

## 2021-08-02 RX ORDER — TAMSULOSIN HYDROCHLORIDE 0.4 MG/1
CAPSULE ORAL
COMMUNITY
Start: 2021-07-28

## 2021-08-02 RX ORDER — PANTOPRAZOLE SODIUM 40 MG/1
TABLET, DELAYED RELEASE ORAL
COMMUNITY
Start: 2021-07-28

## 2021-08-02 RX ORDER — LISINOPRIL AND HYDROCHLOROTHIAZIDE 20; 12.5 MG/1; MG/1
TABLET ORAL
COMMUNITY
Start: 2021-07-28

## 2021-08-02 RX ORDER — METOPROLOL TARTRATE 50 MG/1
TABLET, FILM COATED ORAL
COMMUNITY
Start: 2021-07-28

## 2021-08-02 NOTE — TELEPHONE ENCOUNTER
Spoke to pt for monthly CCM outreach.     Pt states he is getting low on all of his medications and needs refills sent to local pharmacy

## 2021-08-02 NOTE — PROGRESS NOTES
8/2/2021  Spoke to Elliott for CCM.       Updates to patient care team/ comments: UTD  Comment: UTD     Health Maintenance: PT taking medications as prescribed    Diabetes Care Dilated Eye Exam Never done  Annual Wellness Visit Never done  COVID-19 Vaccine(1 appointments. • Active goal from previous outreach:                       Find assisted living    • Patient reported progress toward goal: CCM submitted application to [employer] for [employer] dial a ride.  Pt will need transportation if and

## 2021-08-03 RX ORDER — TAMSULOSIN HYDROCHLORIDE 0.4 MG/1
0.4 CAPSULE ORAL DAILY
Qty: 90 CAPSULE | Refills: 1 | Status: SHIPPED | OUTPATIENT
Start: 2021-08-03 | End: 2022-01-30

## 2021-08-03 RX ORDER — LISINOPRIL AND HYDROCHLOROTHIAZIDE 20; 12.5 MG/1; MG/1
1 TABLET ORAL DAILY
Qty: 90 TABLET | Refills: 0 | Status: SHIPPED | OUTPATIENT
Start: 2021-08-03 | End: 2021-11-01

## 2021-08-03 RX ORDER — PANTOPRAZOLE SODIUM 40 MG/1
40 TABLET, DELAYED RELEASE ORAL
Qty: 90 TABLET | Refills: 1 | Status: SHIPPED | OUTPATIENT
Start: 2021-08-03 | End: 2022-01-30

## 2021-08-03 RX ORDER — METOPROLOL TARTRATE 50 MG/1
50 TABLET, FILM COATED ORAL 2 TIMES DAILY
Qty: 180 TABLET | Refills: 1 | Status: SHIPPED | OUTPATIENT
Start: 2021-08-03 | End: 2022-01-30

## 2021-08-03 RX ORDER — WARFARIN SODIUM 4 MG/1
4 TABLET ORAL DAILY
Qty: 90 TABLET | Refills: 0 | Status: SHIPPED | OUTPATIENT
Start: 2021-08-03

## 2021-08-03 RX ORDER — LOVASTATIN 20 MG/1
20 TABLET ORAL DAILY
Qty: 90 TABLET | Refills: 1 | Status: SHIPPED | OUTPATIENT
Start: 2021-08-03 | End: 2022-01-30

## 2021-08-03 NOTE — TELEPHONE ENCOUNTER
LOV 06/11/2021    LAST LAB 06/28/2021; INR 1.6    LAST RX  Warfarin #90 r0 12/03/2020  Metformin #180 R1 01/18/2021  tamsulosin #90 R1 01/18/2021  Pantoprazole #90 R1 01/18/2021  Metoprolol #180 r1 01/18/2021  Lovastatin #90 R1 01/18/2021  Lisinopril #90 R

## 2021-08-31 PROCEDURE — 99490 CHRNC CARE MGMT STAFF 1ST 20: CPT

## 2021-09-02 ENCOUNTER — PATIENT OUTREACH (OUTPATIENT)
Dept: CASE MANAGEMENT | Age: 66
End: 2021-09-02

## 2021-09-02 DIAGNOSIS — N18.32 STAGE 3B CHRONIC KIDNEY DISEASE (HCC): ICD-10-CM

## 2021-09-02 DIAGNOSIS — E11.9 TYPE 2 DIABETES MELLITUS WITHOUT COMPLICATION, WITH LONG-TERM CURRENT USE OF INSULIN (HCC): ICD-10-CM

## 2021-09-02 DIAGNOSIS — M48.061 SPINAL STENOSIS, LUMBAR REGION, WITHOUT NEUROGENIC CLAUDICATION: ICD-10-CM

## 2021-09-02 DIAGNOSIS — Z79.4 TYPE 2 DIABETES MELLITUS WITHOUT COMPLICATION, WITH LONG-TERM CURRENT USE OF INSULIN (HCC): ICD-10-CM

## 2021-09-02 DIAGNOSIS — N13.8 BENIGN PROSTATIC HYPERPLASIA WITH URINARY OBSTRUCTION: ICD-10-CM

## 2021-09-02 DIAGNOSIS — E78.2 MIXED HYPERLIPIDEMIA: ICD-10-CM

## 2021-09-02 DIAGNOSIS — N40.1 BENIGN PROSTATIC HYPERPLASIA WITH URINARY OBSTRUCTION: ICD-10-CM

## 2021-09-02 DIAGNOSIS — I69.30 LATE EFFECT OF CEREBROVASCULAR ACCIDENT (CVA): ICD-10-CM

## 2021-09-02 NOTE — PROGRESS NOTES
9/2/2021  Spoke to Brooks for CCM.       Updates to patient care team/ comments: UTD  Patient reported changes in medications: UTD  Med Adherence  Comment: PT taking medications as prescribed     Health Maintenance:     Diabetes Care Dilated Eye Exam Never ongoing    Self Management Goals/Action Plan:  No future appointments.     • Active goal from previous outreach:                       Finding assisted living    • Patient reported progress toward goal: pt is waiting for son to come back home before decidin

## 2021-09-30 PROCEDURE — 99490 CHRNC CARE MGMT STAFF 1ST 20: CPT

## 2021-10-14 ENCOUNTER — PATIENT OUTREACH (OUTPATIENT)
Dept: CASE MANAGEMENT | Age: 66
End: 2021-10-14

## 2021-10-14 NOTE — PROGRESS NOTES
[facility] has tried on a few occassions in the last month to contact pt to verify that he has picked up his prescriptions. Pt contact phone rings and goes to a busy signal.    CCM contacted pt local pharmacy and spoke to Roseanna.     Pt has not picked up rx that h

## 2021-11-08 ENCOUNTER — OFFICE VISIT (OUTPATIENT)
Dept: FAMILY MEDICINE CLINIC | Facility: CLINIC | Age: 66
End: 2021-11-08
Payer: MEDICARE

## 2021-11-08 VITALS
TEMPERATURE: 99 F | OXYGEN SATURATION: 98 % | WEIGHT: 177 LBS | DIASTOLIC BLOOD PRESSURE: 80 MMHG | HEIGHT: 70 IN | BODY MASS INDEX: 25.34 KG/M2 | SYSTOLIC BLOOD PRESSURE: 136 MMHG | HEART RATE: 76 BPM

## 2021-11-08 DIAGNOSIS — S01.511D LIP LACERATION, SUBSEQUENT ENCOUNTER: Primary | ICD-10-CM

## 2021-11-08 PROCEDURE — 99214 OFFICE O/P EST MOD 30 MIN: CPT | Performed by: FAMILY MEDICINE

## 2021-11-08 PROCEDURE — 3008F BODY MASS INDEX DOCD: CPT | Performed by: FAMILY MEDICINE

## 2021-11-08 PROCEDURE — 1111F DSCHRG MED/CURRENT MED MERGE: CPT | Performed by: FAMILY MEDICINE

## 2021-11-08 PROCEDURE — 3079F DIAST BP 80-89 MM HG: CPT | Performed by: FAMILY MEDICINE

## 2021-11-08 PROCEDURE — 3075F SYST BP GE 130 - 139MM HG: CPT | Performed by: FAMILY MEDICINE

## 2021-11-08 NOTE — PROGRESS NOTES
Subjective:   Jose Moody is a 78year old male who presents for Hospital F/U     Here for evaluation  Bree Shepherd in the yard 11/4  Witnessed mechanical fall    No loss of consciousness    Seen Emergency Room for repair of laceration  See note below        L procedure: Tolerated well, no immediate complications  Comments:    There were 5 4-0 sutures placed on the upper external lip as well as 2 additional 4-0 nylon sutures placed on the lower aspect of the upper external lip, there were five 4-0 absorbable sutu

## 2021-11-09 ENCOUNTER — TELEPHONE (OUTPATIENT)
Dept: FAMILY MEDICINE CLINIC | Facility: CLINIC | Age: 66
End: 2021-11-09

## 2021-11-11 ENCOUNTER — OFFICE VISIT (OUTPATIENT)
Dept: FAMILY MEDICINE CLINIC | Facility: CLINIC | Age: 66
End: 2021-11-11
Payer: MEDICARE

## 2021-11-11 VITALS
RESPIRATION RATE: 12 BRPM | WEIGHT: 177 LBS | TEMPERATURE: 97 F | HEIGHT: 70 IN | HEART RATE: 65 BPM | OXYGEN SATURATION: 94 % | BODY MASS INDEX: 25.34 KG/M2 | SYSTOLIC BLOOD PRESSURE: 138 MMHG | DIASTOLIC BLOOD PRESSURE: 82 MMHG

## 2021-11-11 DIAGNOSIS — S01.511D LIP LACERATION, SUBSEQUENT ENCOUNTER: Primary | ICD-10-CM

## 2021-11-11 PROCEDURE — 99214 OFFICE O/P EST MOD 30 MIN: CPT | Performed by: FAMILY MEDICINE

## 2021-11-11 PROCEDURE — 3079F DIAST BP 80-89 MM HG: CPT | Performed by: FAMILY MEDICINE

## 2021-11-11 PROCEDURE — 3008F BODY MASS INDEX DOCD: CPT | Performed by: FAMILY MEDICINE

## 2021-11-11 PROCEDURE — 3075F SYST BP GE 130 - 139MM HG: CPT | Performed by: FAMILY MEDICINE

## 2021-11-14 NOTE — PROGRESS NOTES
Subjective:   Mireille Henry is a 78year old male who presents for Suture Removal (upper lip . Bob Springer ..inrm.  6)     Here for evaluation of the laceration to the lips  And suture removal  No pain   No bleeding    History/Other:   has a current medication list w

## 2021-11-24 ENCOUNTER — PATIENT OUTREACH (OUTPATIENT)
Dept: CASE MANAGEMENT | Age: 66
End: 2021-11-24

## 2021-11-24 ENCOUNTER — TELEPHONE (OUTPATIENT)
Dept: FAMILY MEDICINE CLINIC | Facility: CLINIC | Age: 66
End: 2021-11-24

## 2021-11-24 DIAGNOSIS — M48.061 SPINAL STENOSIS, LUMBAR REGION, WITHOUT NEUROGENIC CLAUDICATION: ICD-10-CM

## 2021-11-24 DIAGNOSIS — N18.32 STAGE 3B CHRONIC KIDNEY DISEASE (HCC): ICD-10-CM

## 2021-11-24 DIAGNOSIS — R29.6 FREQUENT FALLS: Primary | ICD-10-CM

## 2021-11-24 DIAGNOSIS — Z86.73 HX OF COMPLETED STROKE: ICD-10-CM

## 2021-11-24 DIAGNOSIS — E78.2 MIXED HYPERLIPIDEMIA: ICD-10-CM

## 2021-11-24 DIAGNOSIS — N39.42 URINARY INCONTINENCE WITHOUT SENSORY AWARENESS: ICD-10-CM

## 2021-11-24 DIAGNOSIS — Z79.4 TYPE 2 DIABETES MELLITUS WITHOUT COMPLICATION, WITH LONG-TERM CURRENT USE OF INSULIN (HCC): ICD-10-CM

## 2021-11-24 DIAGNOSIS — I10 PRIMARY HYPERTENSION: ICD-10-CM

## 2021-11-24 DIAGNOSIS — I69.30 LATE EFFECT OF CEREBROVASCULAR ACCIDENT (CVA): ICD-10-CM

## 2021-11-24 DIAGNOSIS — I10 ESSENTIAL HYPERTENSION: ICD-10-CM

## 2021-11-24 DIAGNOSIS — R19.5 LOOSE STOOLS: ICD-10-CM

## 2021-11-24 DIAGNOSIS — E11.9 TYPE 2 DIABETES MELLITUS WITHOUT COMPLICATION, WITH LONG-TERM CURRENT USE OF INSULIN (HCC): ICD-10-CM

## 2021-11-24 RX ORDER — CEPHALEXIN 500 MG/1
500 CAPSULE ORAL 4 TIMES DAILY
COMMUNITY
Start: 2021-11-04

## 2021-11-24 NOTE — PROGRESS NOTES
Called and spoke with Avni Santiago at [facility] in [address] [id number]. Avni Santiago verified that pt has only picked up cephalaxin recently. Pharmacy states that pt has not picked up any of the recent prescriptions that have been sent in.  Reviewed with [employer] with pt son latoya   18min  Time with [employer]             15 min  [facility] with [facility]                 5min   Mailing resources           5min   Chart review                   3mn   Charting                        5min         Son detailed some of his fathers on [place]

## 2021-11-24 NOTE — TELEPHONE ENCOUNTER
They are not in the [facility] so they will not be able to see him

## 2021-11-24 NOTE — TELEPHONE ENCOUNTER
Robb called stating his dad needs help. He keeps falling, not bathing and is having trouble walking. Spoke to patient and he is requesting more help around the house. He would like to have home health back in place.  Advised patient we can try to set up aga

## 2021-11-30 PROCEDURE — 99439 CHRNC CARE MGMT STAF EA ADDL: CPT

## 2021-11-30 PROCEDURE — 99490 CHRNC CARE MGMT STAFF 1ST 20: CPT

## 2021-12-01 ENCOUNTER — TELEPHONE (OUTPATIENT)
Dept: FAMILY MEDICINE CLINIC | Facility: CLINIC | Age: 66
End: 2021-12-01

## 2021-12-02 ENCOUNTER — TELEPHONE (OUTPATIENT)
Dept: FAMILY MEDICINE CLINIC | Facility: CLINIC | Age: 66
End: 2021-12-02

## 2021-12-02 NOTE — TELEPHONE ENCOUNTER
Spoke with Robb who states his dad is having frequent falls, his is urinating himself, and he has bedsores. He states patient doesn't want to go to a nursing home, and he can't afford to.  He is wanting to know about getting an in home nurse who can be with

## 2021-12-13 ENCOUNTER — TELEPHONE (OUTPATIENT)
Dept: FAMILY MEDICINE CLINIC | Facility: CLINIC | Age: 66
End: 2021-12-13

## 2021-12-15 NOTE — TELEPHONE ENCOUNTER
Robb called back requesting home health. Advised unsure if they will go since [facility] was denied the first time.

## 2021-12-22 NOTE — TELEPHONE ENCOUNTER
[facility] not in network  [facility]  [facility] at home not able to help.

## 2021-12-27 ENCOUNTER — TELEPHONE (OUTPATIENT)
Dept: FAMILY MEDICINE CLINIC | Facility: CLINIC | Age: 66
End: 2021-12-27
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
## 2022-01-06 ENCOUNTER — TELEPHONE (OUTPATIENT)
Dept: FAMILY MEDICINE CLINIC | Facility: CLINIC | Age: 67
End: 2022-01-06

## 2022-01-13 ENCOUNTER — TELEPHONE (OUTPATIENT)
Dept: FAMILY MEDICINE CLINIC | Facility: CLINIC | Age: 67
End: 2022-01-13

## 2022-01-13 NOTE — TELEPHONE ENCOUNTER
Candace from [facility] has been trying to contact pt for physical therapy. Pt is not returning calls so they will not be doing therapy.

## 2022-03-25 ENCOUNTER — PATIENT OUTREACH (OUTPATIENT)
Dept: CASE MANAGEMENT | Age: 67
End: 2022-03-25

## 2022-03-25 NOTE — PROGRESS NOTES
Contacting patient to follow up on CCM for the month. Unable to contact pt at primary contact number.  Left message with pt danae Alvarez    Total time -  min  Total Monthly time-3  min

## 2022-03-29 ENCOUNTER — TELEPHONE (OUTPATIENT)
Dept: FAMILY MEDICINE CLINIC | Facility: CLINIC | Age: 67
End: 2022-03-29

## (undated) NOTE — LETTER
[address] [facility] [address]  [phone]                July 13, 2021    [facility]      Dear [facility]:   It was a pleasure speaking with you over the p